# Patient Record
Sex: MALE | Race: WHITE | NOT HISPANIC OR LATINO | URBAN - METROPOLITAN AREA
[De-identification: names, ages, dates, MRNs, and addresses within clinical notes are randomized per-mention and may not be internally consistent; named-entity substitution may affect disease eponyms.]

---

## 2019-01-28 ENCOUNTER — EMERGENCY (EMERGENCY)
Facility: HOSPITAL | Age: 49
LOS: 1 days | Discharge: HOME | End: 2019-01-28
Attending: EMERGENCY MEDICINE | Admitting: EMERGENCY MEDICINE
Payer: COMMERCIAL

## 2019-01-28 VITALS — DIASTOLIC BLOOD PRESSURE: 95 MMHG | SYSTOLIC BLOOD PRESSURE: 146 MMHG | HEART RATE: 55 BPM

## 2019-01-28 VITALS
HEART RATE: 69 BPM | RESPIRATION RATE: 18 BRPM | SYSTOLIC BLOOD PRESSURE: 164 MMHG | DIASTOLIC BLOOD PRESSURE: 109 MMHG | TEMPERATURE: 98 F | OXYGEN SATURATION: 98 %

## 2019-01-28 DIAGNOSIS — I10 ESSENTIAL (PRIMARY) HYPERTENSION: ICD-10-CM

## 2019-01-28 DIAGNOSIS — R07.89 OTHER CHEST PAIN: ICD-10-CM

## 2019-01-28 LAB — TROPONIN I SERPL-MCNC: <0.017 NG/ML — LOW (ref 0.02–0.06)

## 2019-01-28 PROCEDURE — 99284 EMERGENCY DEPT VISIT MOD MDM: CPT | Mod: 25

## 2019-01-28 PROCEDURE — 71275 CT ANGIOGRAPHY CHEST: CPT | Mod: 26

## 2019-01-28 PROCEDURE — 74174 CTA ABD&PLVS W/CONTRAST: CPT | Mod: 26

## 2019-01-28 PROCEDURE — 99284 EMERGENCY DEPT VISIT MOD MDM: CPT

## 2019-01-28 PROCEDURE — 93010 ELECTROCARDIOGRAM REPORT: CPT | Mod: 59

## 2019-01-28 RX ORDER — ASPIRIN/CALCIUM CARB/MAGNESIUM 324 MG
324 TABLET ORAL ONCE
Qty: 0 | Refills: 0 | Status: COMPLETED | OUTPATIENT
Start: 2019-01-28 | End: 2019-01-28

## 2019-01-28 RX ORDER — NITROGLYCERIN 6.5 MG
0.4 CAPSULE, EXTENDED RELEASE ORAL ONCE
Qty: 0 | Refills: 0 | Status: COMPLETED | OUTPATIENT
Start: 2019-01-28 | End: 2019-01-28

## 2019-01-28 RX ORDER — AMLODIPINE BESYLATE 2.5 MG/1
1 TABLET ORAL
Qty: 14 | Refills: 0 | OUTPATIENT
Start: 2019-01-28 | End: 2019-02-10

## 2019-01-28 RX ORDER — AMLODIPINE BESYLATE 2.5 MG/1
5 TABLET ORAL ONCE
Qty: 0 | Refills: 0 | Status: COMPLETED | OUTPATIENT
Start: 2019-01-28 | End: 2019-01-28

## 2019-01-28 RX ADMIN — Medication 0.4 MILLIGRAM(S): at 16:55

## 2019-01-28 RX ADMIN — Medication 324 MILLIGRAM(S): at 15:29

## 2019-01-28 RX ADMIN — AMLODIPINE BESYLATE 5 MILLIGRAM(S): 2.5 TABLET ORAL at 17:15

## 2019-01-28 NOTE — ED ADULT NURSE NOTE - OBJECTIVE STATEMENT
pt reports mid sternal chest pain x 3 hours.  pain is constant, achey, nothing worsens or helps.   no meds at home.

## 2019-01-28 NOTE — ED ADULT NURSE REASSESSMENT NOTE - NS ED NURSE REASSESS COMMENT FT1
Patient c/o chest pain. EKG done. Nitroglycerin 0.4 mg given SL, per Dr. Savage order. Patient states at this time chest pain "is getting better". Dr. Lopez at stretcher side.

## 2019-01-28 NOTE — ED PROVIDER NOTE - PROGRESS NOTE DETAILS
Pt c/o chest pain at this time.  Repeat EKG done and unchanged from previous.  Cardiologist, Dr. Davila consulting. Results discussed with pt.  Incidental findings on CT discussed.  Pt denies abd pain and dysuria.  He states he had a normal UA and culture 2 days and is not concerned.  Pt requests discharge.  Copy of results, ekg and cd given.

## 2019-01-28 NOTE — ED PROVIDER NOTE - NSFOLLOWUPINSTRUCTIONS_ED_ALL_ED_FT
Follow up with cardiology.  Call for follow up appointment.  Take Norvasc as prescribed for blood pressure  Return for worsening symptoms or other concerns. Follow up with cardiology.  Call fo follow up appointment.  Return for worsening symptoms.

## 2019-01-28 NOTE — ED PROVIDER NOTE - CARE PROVIDER_API CALL
Ruiz Davila), Cardiovascular Disease; Internal Medicine  [address] [facility]  [address] [facility] [place] [address]  Phone: [phone]  Fax: [phone]

## 2019-01-28 NOTE — ED PROVIDER NOTE - MEDICAL DECISION MAKING DETAILS
50 y/o M w/ no pmhx, no fhx, presents to ED w/ constant chest pain radiating to his back. Will give ASA, obtain blood work, angio chest and abd CT and reassess. 48 y/o M w/ no pmhx, no fhx, presents to ED w/ constant chest pain radiating to his back x 2.5 hours. Pt well appearing.  Elevated blood pressure.  Neuros intact.  Labs, trop x 2, CTA dissection protocol negative for dissection.  Incidental findings on CT discussed with pt.  Cards consulted during ED visit.  Pt responded to Nitro and Norvasc and has been pain free since.  Will start Norvasc 5mg Rx.  Pt advised to f/u with cardiology.

## 2019-01-28 NOTE — ED PROVIDER NOTE - OBJECTIVE STATEMENT
50 y/o M w/ no pmhx, no fhx presents to ED w/ c/o constant pressure-like chest pain radiating to his back. States it began at 6:30am and went away when he began to walk. Then during lunch time, around noon, chest pain came back and has been constant since. States when he arrived to ED, he had sob. Reports that he has been keeping an eye on his blood pressure as it has been elevated 135-140/100 for the last 2 weeks when his usual is 120/80. Recently had blood work done w/ nml results. Non smoker, occasionally smokes marijuana. Allergic to penicillin. Denies n/v, chills, sweats, leg swelling.  No recent travels. Has not taken aspirin.

## 2019-09-01 NOTE — ED ADULT NURSE NOTE - NSSISCREENINGQ4_ED_A_ED
Verbal/written post procedure instructions were given to patient/caregiver./Instructed patient/caregiver regarding signs and symptoms of infection./Instructed patient/caregiver to follow-up with primary care physician./Keep the cast/splint/dressing clean and dry.
No

## 2023-04-03 NOTE — CONSULT NOTE ADULT - SUBJECTIVE AND OBJECTIVE BOX
[facility] Consultation    CHIEF COMPLAINT: CP    HISTORY OF PRESENT ILLNESS: 48 y/o male with history of borderline b/p who presented secondary to chest discomfort. The discomfort was midsternal and lasted a few min. He remarked on associated shortness of breath as well. He had a stress test about 2 years ago. Noted occasional borderline b/p in the past.     PAST MEDICAL & SURGICAL HISTORY:  No pertinent past medical history  No significant past surgical history        Allergies    No Known Allergies    Intolerances  MEDICATIONS:  unremarkable    FAMILY HISTORY: no CAD    SOCIAL HISTORY:  no EtOH, tobacco or drug use    REVIEW OF SYSTEMS:  CONSTITUTIONAL: No fever, weight loss, or fatigue  EYES: No eye pain, visual disturbances, or discharge  ENMT:  No difficulty hearing, tinnitus, vertigo; No sinus or throat pain  NECK: No pain or stiffness  BREASTS: No pain, masses, or nipple discharge  RESPIRATORY: No cough, wheezing, chills or hemoptysis; No Shortness of Breath  CARDIOVASCULAR: No chest pain, palpitations, dizziness, or leg swelling  GASTROINTESTINAL: No abdominal or epigastric pain. No nausea, vomiting, or hematemesis; No diarrhea or constipation. No melena or hematochezia.  GENITOURINARY: No dysuria, frequency, hematuria, or incontinence  NEUROLOGICAL: No headaches, memory loss, loss of strength, numbness, or tremors  SKIN: No itching, burning, rashes, or lesions   LYMPH Nodes: No enlarged glands  ENDOCRINE: No heat or cold intolerance; No hair loss  MUSCULOSKELETAL: No joint pain or swelling; No muscle, back, or extremity pain  PSYCHIATRIC: No depression, anxiety, mood swings, or difficulty sleeping  HEME/LYMPH: No easy bruising, or bleeding gums  ALLERY AND IMMUNOLOGIC: No hives or eczema	      PHYSICAL EXAM:  T(C): 36.9 (01-28-19 @ 13:29), Max: 36.9 (01-28-19 @ 13:29)  HR: 55 (01-28-19 @ 17:16) (53 - 69)  BP: 146/95 (01-28-19 @ 17:16) (146/95 - 167/108)  RR: 16 (01-28-19 @ 16:47) (16 - 20)  SpO2: 96% (01-28-19 @ 16:47) (96% - 98%)  Wt(kg): --  I&O's Summary    Appearance: Normal	  HEENT:   Normal oral mucosa, PERRL, EOMI	  Lymphatic: No lymphadenopathy  Cardiovascular: Normal S1 S2, No JVD, No murmurs, No edema  Respiratory: Lungs clear to auscultation	  Psychiatry: A & O x 3, Mood & affect appropriate  Gastrointestinal:  Soft, Non-tender, + BS	  Skin: No rashes, No ecchymoses, No cyanosis	  Neurologic: Non-focal  Extremities: Normal range of motion, No clubbing, cyanosis or edema  Vascular: Peripheral pulses palpable 2+ bilaterally  	    ECG:  	SR no St-Changes    LABS:	 	  CARDIAC MARKERS:  Troponin I, Serum: <0.017 ng/mL (01-28 @ 18:19)  Troponin I, Serum: <0.017 ng/mL (01-28 @ 13:45)                                  17.1   7.8   )-----------( 262      ( 28 Jan 2019 13:45 )             49.0     01-28    136  |  100  |  18  ----------------------------<  99  3.8   |  26  |  1.20    Ca    9.1      28 Jan 2019 13:45    TPro  7.7  /  Alb  4.2  /  TBili  1.1  /  DBili  x   /  AST  30  /  ALT  52<H>  /  AlkPhos  42  01-28      ASSESSMENT/PLAN: 	48 y/o male with with chest discomfort   1. patient seen and examined, chart reviewed  2. stable for discharge  3. start norvasc 5 mg   4. will make own arrangements for o/p follow up    I spent 45 min in care of this patient
Calm/Appropriate